# Patient Record
(demographics unavailable — no encounter records)

---

## 2025-04-17 NOTE — HISTORY OF PRESENT ILLNESS
[de-identified] : Initial visit: Right bicep pain  Reason: working out injury  Duration: 1 year  Prior studies: x rays ordered  Symptoms: Sharp / Stabbing / Dull / Pinching  Aggravating Fx: REACHING/ WORKING  Alleviating Fx: LIMIT MOVEMENTS - STAY AWARE  Pain level: 2/10 Surgical Hx: N/A Allergies: NKA Physical therapy: currently - slightly improved

## 2025-04-17 NOTE — ASSESSMENT
[FreeTextEntry1] : Discussed at length with patient exam history and imaging as well as treatment options and at this time patient elects anti-inflammatory home exercise and PT given chronicity of symptoms I did recommend injection to the bicipital groove and he declines at this time.  If no improvement follow-up for consideration of injection and ultimately persistent symptoms MRI evaluation

## 2025-04-17 NOTE — PHYSICAL EXAM
[de-identified] : Right Shoulder: Constitutional: The patient is healthy-appearing and in no apparent distress.   Cardiovascular System:  The capillary refill is less than 2 seconds.   Skin:  There are no skin abnormalities.  C-Spine/Neck:  Active Range of Motion: Flexion				50 Extension			60 Lateral rotation			80    Right Shoulder:  Inspection:  There is no atrophy, erythema, warmth, swelling. There is no scapular winging. There is no AC prominence.   Bony Palpation:  There is no tenderness of the clavicle. There is no tenderness of the acromioclavicular joint. There is no tenderness of the greater tuberosity.  There is tenderness of the bicipital groove.   Soft Tissue Palpation:  There is no tenderness of the trapezius. There is no tenderness of the rhomboid. There is no tenderness of the subacromial bursa.   Active Range of Motion:  Forward flexion- 				180  Abduction-					150 External rotation at 0 degrees abduction-	80  Internal rotation at 0 degrees abduction-	80  Passive Range of Motion:  Forward flexion- 			180  Abduction-				150 External rotation at 0 deg abduction-	80  Internal rotation at 0 deg abduction-	80  Special Tests:  Hawkin's  				Positive  Speed's  				positive AC cross-over 			            Negative Sherman's  				Negative  Stability:  There is no general laxity.  There is no anterior apprehension.  Strength:  Supraspinatus abduction 		5/5 External rotation at 0 deg abduction 	5/5 Internal rotation at 0 deg abduction	5/5 Scapular elevation 			5/5   Neurological System:   There is normal sensation to light touch C5-T1.   Psychiatric:  The patient demonstrates a normal mood and affect and is active and alert.  [de-identified] : Based on detailed discussion with history and physical examination of the patient, x-ray evaluation is recommended and ordered for treatment and diagnosis. X-ray right shoulder 2 view: There is no significant bony / soft tissue abnormality, arthritis, or fracture.